# Patient Record
Sex: FEMALE | Race: WHITE | Employment: FULL TIME | URBAN - METROPOLITAN AREA
[De-identification: names, ages, dates, MRNs, and addresses within clinical notes are randomized per-mention and may not be internally consistent; named-entity substitution may affect disease eponyms.]

---

## 2019-01-23 ENCOUNTER — HOSPITAL ENCOUNTER (OUTPATIENT)
Dept: OTHER | Age: 54
Discharge: HOME | End: 2019-01-23
Payer: COMMERCIAL

## 2019-01-23 VITALS — HEART RATE: 80 BPM | DIASTOLIC BLOOD PRESSURE: 67 MMHG | SYSTOLIC BLOOD PRESSURE: 107 MMHG | WEIGHT: 142.8 LBS

## 2019-01-23 LAB
BILIRUBIN URINE: NEGATIVE MG/DL
BLOOD, URINE: ABNORMAL
CLARITY: CLEAR
COLOR: YELLOW
GLUCOSE URINE: NEGATIVE MG/DL
KETONES, URINE: ABNORMAL MG/DL
LEUKOCYTE ESTERASE, URINE: ABNORMAL
NITRITE, URINE: NEGATIVE
PH UA: 7.5
PROTEIN UA: NEGATIVE MG/DL
SPECIFIC GRAVITY UA: 1.02
UROBILINOGEN, URINE: 0.2 E.U./DL

## 2019-01-23 PROCEDURE — 81003 URINALYSIS AUTO W/O SCOPE: CPT

## 2019-01-23 PROCEDURE — 99201 HC NEW PT, OUTPT VISIT LEVEL 1: CPT

## 2019-01-23 PROCEDURE — 87086 URINE CULTURE/COLONY COUNT: CPT

## 2019-01-23 RX ORDER — ATENOLOL 25 MG/1
25 TABLET ORAL DAILY
COMMUNITY

## 2019-01-25 LAB — URINE CULTURE, ROUTINE: NORMAL

## 2019-03-04 ENCOUNTER — HOSPITAL ENCOUNTER (OUTPATIENT)
Dept: OTHER | Age: 54
Discharge: HOME | End: 2019-03-04

## 2019-03-04 PROCEDURE — 9900000078 HC MONA LISA TOUCH THERAPY PRE PAY

## 2019-05-06 ENCOUNTER — HOSPITAL ENCOUNTER (OUTPATIENT)
Dept: OTHER | Age: 54
Discharge: HOME | End: 2019-05-06

## 2019-05-06 VITALS — SYSTOLIC BLOOD PRESSURE: 106 MMHG | HEART RATE: 62 BPM | WEIGHT: 141.5 LBS | DIASTOLIC BLOOD PRESSURE: 67 MMHG

## 2019-05-06 PROCEDURE — 9900000078 HC MONA LISA TOUCH THERAPY PRE PAY

## 2019-05-06 NOTE — PROGRESS NOTES
[place].  A series of pulses were then delivered. The probe was rotated 90 degrees throughout the procedure. Probe rotation at  12, 2, 4, 6, 8, [facility]37 [facility] Treatment Setting External:  N/A     Vaginal ring utilized: No     Smoke Evacuator: Yes           Procedural Pain:  0  / 10     Post Procedural Pain:  0 / 10     Response to treatment:  Well tolerated by patient. Plan:     Treatment Note please see attached Discharge Instructions    In my professional opinion this patient would benefit from Continued Treatment: Yes    Electronically signed by Lottie Daly MD on 5/6/2019 at 11:50 AM    Written patient dismissal instructions given to patient and signed by patient or POA.              Electronically signed by Lottie Daly MD on 5/6/2019 at 11:50 AM

## 2019-05-06 NOTE — FLOWSHEET NOTE
Reviewed instructions for post internal [facility] treatment with patient. Sherren Squibb verbalized understanding of instructions. Left the Pelvic Floor Center in stable condition.

## 2019-06-17 ENCOUNTER — HOSPITAL ENCOUNTER (OUTPATIENT)
Dept: OTHER | Age: 54
Discharge: HOME | End: 2019-06-17
Payer: COMMERCIAL

## 2019-06-17 VITALS — SYSTOLIC BLOOD PRESSURE: 109 MMHG | WEIGHT: 141.5 LBS | DIASTOLIC BLOOD PRESSURE: 71 MMHG | HEART RATE: 60 BPM

## 2019-06-17 PROCEDURE — 9900000078 HC MONA LISA TOUCH THERAPY PRE PAY

## 2019-06-17 NOTE — PROGRESS NOTES
Pelvic Floor Center  Progress Note and Procedure Note      Sania Waters  MEDICAL RECORD NUMBER:  [id number]  AGE: 48 y.o. GENDER: female  : 1965  EPISODE DATE:  2019    Subjective:     Chief Complaint: Vaginal Atrophy     HISTORY of PRESENT ILLNESS (HPI)    History of  Context:  Sania Waters is a 48 y.o. female who presents today to the [address] for [facility] treatment because the Patient wants alternative therapy other than hormone therapy. Pain Timing/Severity with Sexual Activity: severe  Quality of pain: burning  Severity:  10 / 10   Associated Signs/Symptoms: none   Contributing Factors: activities including intercourse          PAST MEDICAL HISTORY        Diagnosis Date    Tachycardia     Diagnosed at age 38. PAST SURGICAL HISTORY    Past Surgical History:   Procedure Laterality Date    HYSTERECTOMY      2 years ago. Ovaries spared. FAMILY HISTORY    No family history on file. SOCIAL HISTORY    Social History     Tobacco Use    Smoking status: Former Smoker    Smokeless tobacco: Never Used    Tobacco comment: Ocasionally in college   Substance Use Topics    Alcohol use: Yes     Comment: Occasionally    Drug use: No       ALLERGIES    Allergies   Allergen Reactions    Morphine Nausea And Vomiting       MEDICATIONS    Current Outpatient Medications on File Prior to Encounter   Medication Sig Dispense Refill    atenolol (TENORMIN) 25 MG tablet Take 25 mg by mouth daily      Omega-3 Fatty Acids (OMEGA-3 FISH OIL PO) Take 1 capsule by mouth daily       No current facility-administered medications on file prior to encounter. REVIEW OF SYSTEMS    Pertinent items are noted in HPI.     Objective:      /71   Pulse 60   Wt 141 lb 8 oz (64.2 kg)     Wt Readings from Last 2 Encounters:   19 141 lb 8 oz (64.2 kg)   19 141 lb 8 oz (64.2 kg)       PHYSICAL EXAM    General Appearance: alert and oriented to person, place and time, well developed and well- nourished, in no acute distress  Skin: warm and dry, no rash or erythema  Head: normocephalic and atraumatic  Eyes: pupils equal, round, and reactive to light, extraocular eye movements intact, conjunctivae normal  ENT: tympanic membrane, external ear and ear canal normal bilaterally, nose without deformity, nasal mucosa and turbinates normal without polyps  Neck: supple and non-tender without mass, no thyromegaly or thyroid nodules, no cervical lymphadenopathy  Pulmonary/Chest: clear to auscultation bilaterally- no wheezes, rales or rhonchi, normal air movement, no respiratory distress  Cardiovascular: normal rate, regular rhythm, normal S1 and S2, no murmurs, rubs, clicks, or gallops, distal pulses intact, no carotid bruits  Abdomen: soft, non-tender, non-distended, normal bowel sounds, no masses or organomegaly  Pelvic: normal external genitalia, vulva, atrophic vagina  Extremities: no cyanosis, clubbing or edema  Musculoskeletal: normal range of motion, no joint swelling, deformity or tenderness  Neurologic: reflexes normal and symmetric, no cranial nerve deficit, gait, coordination and speech normal      Assessment:     There is no problem list on file for this patient. Procedure Note  Indications:  Based on my examination of this patient today, laser treatment with the Hari Quivers is required to promote collagen growth. Performed by: Saray Dior MD    Consent obtained:  Yes    Treatment options were fully discussed with patient. The patient fully verbalized understanding regarding Hari Quivers treatments for the above diagnosis.      Patient is placed in lithotomy position:  Yes    Time out taken:  Yes    Pain Control: N/A    Proper protective laser eyewear utilized: Yes     Hari Quivers: internal    Hari Quivers Treatment Settings Internal:  [30] W Dwell time 1000, spacing 1000, Smart Stack 3     Solid Internal 90 degree Vaginal Probe was inserted until resist was met at the end of the vaginal canal.  A series of pulses were then delivered. The probe was rotated 90 degrees throughout the procedure. Probe rotation at  12, 2, 4, 6, 8, [address] Treatment Setting External:  N/A     Vaginal ring utilized: No     Smoke Evacuator: Yes           Procedural Pain:  0  / 10     Post Procedural Pain:  0 / 10     Response to treatment:  Well tolerated by patient. Plan:     Treatment Note please see attached Discharge Instructions    In my professional opinion this patient would benefit from Continued Treatment: Yes    Electronically signed by Patrice Lucero MD on 6/17/2019 at 12:21 PM    Written patient dismissal instructions given to patient and signed by patient or POA.              Electronically signed by Patrice Lucero MD on 6/17/2019 at 12:21 PM

## 2019-06-17 NOTE — FLOWSHEET NOTE
Patient provided with discharge instructions for internal [facility] treatment. Discussed instructions with patient and she verbalized understanding. Patient left floor in stable condition.